# Patient Record
Sex: MALE | Race: WHITE | NOT HISPANIC OR LATINO | Employment: PART TIME | URBAN - METROPOLITAN AREA
[De-identification: names, ages, dates, MRNs, and addresses within clinical notes are randomized per-mention and may not be internally consistent; named-entity substitution may affect disease eponyms.]

---

## 2022-04-07 ENCOUNTER — HOSPITAL ENCOUNTER (EMERGENCY)
Facility: HOSPITAL | Age: 22
Discharge: HOME | End: 2022-04-07
Attending: EMERGENCY MEDICINE | Admitting: EMERGENCY MEDICINE

## 2022-04-07 VITALS
OXYGEN SATURATION: 98 % | HEIGHT: 68 IN | HEART RATE: 68 BPM | DIASTOLIC BLOOD PRESSURE: 72 MMHG | RESPIRATION RATE: 16 BRPM | TEMPERATURE: 98.8 F | BODY MASS INDEX: 23.34 KG/M2 | SYSTOLIC BLOOD PRESSURE: 120 MMHG | WEIGHT: 154 LBS

## 2022-04-07 DIAGNOSIS — S01.81XA FACIAL LACERATION, INITIAL ENCOUNTER: ICD-10-CM
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
## 2022-04-07 PROCEDURE — 99282 EMERGENCY DEPT VISIT SF MDM: CPT

## 2022-04-07 RX ORDER — AMOXICILLIN AND CLAVULANATE POTASSIUM 875; 125 MG/1; MG/1
1 TABLET, FILM COATED ORAL 2 TIMES DAILY
Qty: 14 TABLET | Refills: 0 | Status: SHIPPED | OUTPATIENT
Start: 2022-04-07 | End: 2022-04-14

## 2022-04-07 RX ORDER — TRAMADOL HYDROCHLORIDE 50 MG/1
50 TABLET ORAL EVERY 6 HOURS PRN
Qty: 12 TABLET | Refills: 0 | Status: SHIPPED | OUTPATIENT
Start: 2022-04-07

## 2022-04-07 NOTE — ED NOTES
Patient presents to ED with dog bite to lip, jaw, and chin. Patient is the owner of the dog. Patient states his dog is older and has to carried around. He went to pick him up and the dog got scared and bit him in the face. Patient states dog has not bitten him in the past and they have had him for 8 years. They do not take the dog to the vet, and dog is not up to date on rabies vaccine or any other vaccines. Animal Bite form has been filled out and faxed to Animal control.

## 2022-04-07 NOTE — ED PROVIDER NOTES
Subjective   Patient is a 21-year-old male who presents with father at bedside with complaints of a dog bite that happened just prior to arrival to the ED.  Patient states this is the family dog.  Patient reports laceration along his and left lower jaw.  Patient denies any significant pain and has been able to control the bleeding with pressure.  Patient denies any paresthesias numbness or weakness of the area.  He is up-to-date on tetanus vaccination. patient's father at bedside states they are unsure of the dog's vaccine status as they have not taken the dog to the vet in the past 8 years.  Patient denies any difficulty swallowing or handling his oral secretions.  No chest pain or shortness of breath.      History provided by:  Patient and parent      Review of Systems   Constitutional: Negative.    HENT: Negative for dental problem, sore throat and trouble swallowing.    Eyes: Negative for photophobia and visual disturbance.   Respiratory: Negative.    Cardiovascular: Negative.    Gastrointestinal: Negative for nausea and vomiting.   Musculoskeletal: Negative for neck pain.   Skin: Positive for wound.   Neurological: Negative for dizziness, weakness, light-headedness, numbness and headaches.   Hematological: Does not bruise/bleed easily.       History reviewed. No pertinent past medical history.    No Known Allergies    History reviewed. No pertinent surgical history.    History reviewed. No pertinent family history.    Social History     Socioeconomic History   • Marital status: Single           Objective   Physical Exam  Vitals and nursing note reviewed.   Constitutional:       General: He is not in acute distress.     Appearance: Normal appearance. He is well-developed. He is not ill-appearing, toxic-appearing or diaphoretic.   HENT:      Head: Normocephalic.        Mouth/Throat:      Mouth: Mucous membranes are moist.      Pharynx: Oropharynx is clear.   Eyes:      General: No scleral icterus.     Extraocular  Movements: Extraocular movements intact.      Pupils: Pupils are equal, round, and reactive to light.   Neck:      Trachea: Trachea and phonation normal.   Cardiovascular:      Rate and Rhythm: Normal rate and regular rhythm.      Pulses: Normal pulses.      Heart sounds: No murmur heard.    No friction rub. No gallop.   Pulmonary:      Effort: Pulmonary effort is normal. No tachypnea, accessory muscle usage or respiratory distress.      Breath sounds: Normal breath sounds. No stridor. No decreased breath sounds, wheezing, rhonchi or rales.   Chest:      Chest wall: No mass, deformity, tenderness or crepitus.   Musculoskeletal:      Cervical back: Normal range of motion and neck supple. No rigidity. No pain with movement, spinous process tenderness or muscular tenderness.   Skin:     General: Skin is warm.      Capillary Refill: Capillary refill takes less than 2 seconds.      Findings: No rash.   Neurological:      General: No focal deficit present.      Mental Status: He is alert and oriented to person, place, and time.   Psychiatric:         Mood and Affect: Mood normal.         Behavior: Behavior normal.         Laceration Repair    Date/Time: 4/7/2022 9:48 PM  Performed by: Silverio Ackerman PA  Authorized by: Capo Mistry MD     Consent:     Consent obtained:  Verbal    Consent given by:  Patient    Risks, benefits, and alternatives were discussed: yes      Risks discussed:  Infection, pain, poor wound healing, poor cosmetic result and need for additional repair    Alternatives discussed:  No treatment and delayed treatment  Universal protocol:     Procedure explained and questions answered to patient or proxy's satisfaction: yes      Immediately prior to procedure, a time out was called: yes      Patient identity confirmed:  Verbally with patient  Anesthesia:     Anesthesia method:  Local infiltration    Local anesthetic:  Lidocaine 1% w/o epi  Laceration details:     Location:  Lip    Lip location:   Lower exterior lip    Length (cm):  0.5  Pre-procedure details:     Preparation:  Patient was prepped and draped in usual sterile fashion  Exploration:     Wound exploration: wound explored through full range of motion and entire depth of wound visualized      Wound extent: no foreign bodies/material noted and no nerve damage noted    Treatment:     Area cleansed with:  Soap and water and saline    Amount of cleaning:  Extensive    Visualized foreign bodies/material removed: no    Skin repair:     Repair method:  Sutures    Suture size:  6-0    Suture material:  Nylon    Suture technique:  Simple interrupted    Number of sutures:  3  Approximation:     Vermilion border well-aligned: yes    Repair type:     Repair type:  Simple  Post-procedure details:     Dressing:  Antibiotic ointment    Procedure completion:  Tolerated well, no immediate complications    Laceration Repair    Date/Time: 4/8/2022 4:04 AM  Performed by: Silverio Ackerman PA  Authorized by: Capo Mistry MD     Consent:     Consent obtained:  Verbal    Consent given by:  Patient    Risks, benefits, and alternatives were discussed: yes      Risks discussed:  Infection, pain, poor cosmetic result, poor wound healing, nerve damage and need for additional repair    Alternatives discussed:  No treatment and delayed treatment  Universal protocol:     Procedure explained and questions answered to patient or proxy's satisfaction: yes      Immediately prior to procedure, a time out was called: yes      Patient identity confirmed:  Verbally with patient  Anesthesia:     Anesthesia method:  Local infiltration    Local anesthetic:  Lidocaine 1% w/o epi  Laceration details:     Location:  Lip    Lip location:  Lower interior lip    Length (cm):  1.5  Pre-procedure details:     Preparation:  Patient was prepped and draped in usual sterile fashion  Exploration:     Wound exploration: wound explored through full range of motion and entire depth of wound  visualized      Wound extent: no foreign bodies/material noted    Treatment:     Area cleansed with:  Saline    Amount of cleaning:  Standard  Skin repair:     Repair method:  Sutures    Suture size:  4-0    Wound skin closure material used: Vicryl.    Suture technique:  Simple interrupted  Repair type:     Repair type:  Simple  Post-procedure details:     Dressing:  Open (no dressing)    Procedure completion:  Tolerated well, no immediate complications  Laceration Repair    Date/Time: 4/8/2022 4:06 AM  Performed by: Silverio Ackerman PA  Authorized by: Capo Mistry MD     Consent:     Consent obtained:  Verbal    Consent given by:  Patient    Risks, benefits, and alternatives were discussed: yes      Risks discussed:  Infection, pain, poor cosmetic result, poor wound healing, nerve damage and need for additional repair    Alternatives discussed:  No treatment and delayed treatment  Universal protocol:     Procedure explained and questions answered to patient or proxy's satisfaction: yes      Immediately prior to procedure, a time out was called: yes      Patient identity confirmed:  Verbally with patient  Anesthesia:     Anesthesia method:  Local infiltration    Local anesthetic:  Lidocaine 1% WITH epi  Laceration details:     Location:  Face    Face location:  Chin    Length (cm):  2  Pre-procedure details:     Preparation:  Patient was prepped and draped in usual sterile fashion  Exploration:     Wound exploration: wound explored through full range of motion and entire depth of wound visualized      Wound extent: no foreign bodies/material noted and no nerve damage noted    Treatment:     Area cleansed with:  Soap and water and saline    Amount of cleaning:  Extensive  Skin repair:     Repair method:  Sutures    Suture size:  6-0    Suture material:  Nylon    Suture technique:  Simple interrupted    Number of sutures:  7  Post-procedure details:     Dressing:  Antibiotic ointment    Procedure completion:   "Tolerated well, no immediate complications               ED Course    /72   Pulse 68   Temp 98.8 °F (37.1 °C)   Resp 16   Ht 172.7 cm (68\")   Wt 69.9 kg (154 lb)   SpO2 98%   BMI 23.42 kg/m²   Medications - No data to display  Labs Reviewed - No data to display  No radiology results for the last day                                               MDM  Number of Diagnoses or Management Options  Dog bite, initial encounter  Facial laceration, initial encounter  Diagnosis management comments: Chart Review:  Comorbidity: As per past medical history  Disposition/Treatment:  Appropriate PPE was worn during exam and throughout all encounters with the patient.  While in the ED patient was afebrile and appeared nontoxic.  Airway was throughout ED stay. Case was discussed with attending who recommended no rabies vaccination at this time as this is a family dog that is been in their family for 8 years.  This was discussed with the patient's family and was in agreement with plan as well.  Patient had lacerations repaired as above and tolerated well.  Patient was given tramadol and Augmentin for home.  Inspect was queried.  Patient was given wound care instructions along with signs and symptoms to return to the ED.  Stable time of discharge and in agreement with plan.  Patient will repeat referred to facial plastic surgery as desired      Final diagnoses:   Dog bite, initial encounter   Facial laceration, initial encounter       ED Disposition  ED Disposition     ED Disposition   Discharge    Condition   Stable    Comment   --             [facility] EMERGENCY DEPARTMENT  [address] [address] [phone]  Go to   If symptoms worsen    PATIENT CONNECTION - [facility] [address] [phone]  Call   If you do not have a primary care provider    [facility]  [address] [address] [phone]  Call   As needed       "   Medication List      New Prescriptions    amoxicillin-clavulanate 875-125 MG per tablet  Commonly known as: AUGMENTIN  Take 1 tablet by mouth 2 (Two) Times a Day for 7 days.     traMADol 50 MG tablet  Commonly known as: ULTRAM  Take 1 tablet by mouth Every 6 (Six) Hours As Needed for Moderate Pain .           Where to Get Your Medications      These medications were sent to [id number] - [place], [place] - [address] AT SEC OF [facility] [address] - [phone]  - [phone] FX  [address] [place] [address]    Phone: [phone]   · amoxicillin-clavulanate 875-125 MG per tablet  · traMADol 50 MG tablet          Silverio Ackerman PA  04/08/22 1527

## 2022-04-08 NOTE — DISCHARGE INSTRUCTIONS
Keep the wound clean with soap and water daily.  Apply antibiotic ointment daily.  Sutures to be removed in 5 days, scheduled appointment with your primary care provider to have this done, if you do not have a primary care provider any Urgent Care or immediate Care can remove sutures.     You have 1 suture in your mouth that will absorb on its own.  Avoid spicy foods.  Rinse mouth daily with salt water or antiseptic mouthwash.     Take tramadol as needed for pain.  Do not operate heavy machinery or drink alcohol while taking this medication.  May alternate with Tylenol ibuprofen as needed    Apply ice to painful areas for 20 minutes at a time for the next 22 hours help with pain and swelling    Take antibiotic as directed.  Be sure to take full course.    Follow-up with your primary care provider in 3-5 days.  If you do not have a primary care provider call [phone] for help in finding one, or you may follow up with [facility] at [phone].    Return to ED for any new or worsening symptoms    Follow-up with facial plastic surgery as desired